# Patient Record
Sex: MALE | Race: BLACK OR AFRICAN AMERICAN | Employment: UNEMPLOYED | URBAN - METROPOLITAN AREA
[De-identification: names, ages, dates, MRNs, and addresses within clinical notes are randomized per-mention and may not be internally consistent; named-entity substitution may affect disease eponyms.]

---

## 2017-08-08 LAB
AVERAGE GLUCOSE: NORMAL
HBA1C MFR BLD: 7.6 %

## 2017-09-14 ENCOUNTER — OFFICE VISIT (OUTPATIENT)
Dept: FAMILY MEDICINE CLINIC | Age: 47
End: 2017-09-14
Payer: MEDICARE

## 2017-09-14 VITALS
WEIGHT: 306 LBS | HEART RATE: 98 BPM | BODY MASS INDEX: 46.38 KG/M2 | RESPIRATION RATE: 20 BRPM | TEMPERATURE: 98.3 F | SYSTOLIC BLOOD PRESSURE: 134 MMHG | DIASTOLIC BLOOD PRESSURE: 85 MMHG | OXYGEN SATURATION: 94 % | HEIGHT: 68 IN

## 2017-09-14 DIAGNOSIS — H57.12 LEFT EYE PAIN: ICD-10-CM

## 2017-09-14 DIAGNOSIS — H10.502 BLEPHAROCONJUNCTIVITIS OF LEFT EYE, UNSPECIFIED BLEPHAROCONJUNCTIVITIS TYPE: Primary | ICD-10-CM

## 2017-09-14 PROBLEM — E55.9 UNSPECIFIED VITAMIN D DEFICIENCY: Status: ACTIVE | Noted: 2017-06-27

## 2017-09-14 PROBLEM — J20.9 ACUTE BRONCHITIS WITH BRONCHOSPASM: Status: ACTIVE | Noted: 2017-03-16

## 2017-09-14 PROBLEM — Z96.659 PAIN DUE TO KNEE JOINT PROSTHESIS (HCC): Status: ACTIVE | Noted: 2017-08-08

## 2017-09-14 PROBLEM — E78.5 HYPERLIPIDEMIA: Status: ACTIVE | Noted: 2017-05-09

## 2017-09-14 PROBLEM — I10 ESSENTIAL (PRIMARY) HYPERTENSION: Status: ACTIVE | Noted: 2017-05-09

## 2017-09-14 PROBLEM — F17.200 NICOTINE DEPENDENCE: Status: ACTIVE | Noted: 2017-08-28

## 2017-09-14 PROBLEM — J40 BRONCHITIS: Status: ACTIVE | Noted: 2017-03-19

## 2017-09-14 PROBLEM — M25.569 GONALGIA: Status: ACTIVE | Noted: 2017-04-30

## 2017-09-14 PROBLEM — Z96.659 ARTIFICIAL KNEE JOINT PRESENT: Status: ACTIVE | Noted: 2017-06-27

## 2017-09-14 PROBLEM — R55 EPISODE OF SYNCOPE: Status: ACTIVE | Noted: 2017-09-14

## 2017-09-14 PROBLEM — R11.14 BILIOUS VOMITING: Status: ACTIVE | Noted: 2017-04-30

## 2017-09-14 PROBLEM — H61.20 CERUMEN IMPACTION: Status: ACTIVE | Noted: 2017-05-25

## 2017-09-14 PROBLEM — Z79.899 OTHER LONG TERM (CURRENT) DRUG THERAPY: Status: ACTIVE | Noted: 2017-08-08

## 2017-09-14 PROBLEM — R07.9 CHEST PAIN: Status: ACTIVE | Noted: 2017-09-14

## 2017-09-14 PROBLEM — T84.84XA PAIN DUE TO KNEE JOINT PROSTHESIS (HCC): Status: ACTIVE | Noted: 2017-08-08

## 2017-09-14 PROCEDURE — 99214 OFFICE O/P EST MOD 30 MIN: CPT | Performed by: FAMILY MEDICINE

## 2017-09-14 RX ORDER — CARVEDILOL 12.5 MG/1
TABLET ORAL
Refills: 0 | COMMUNITY
Start: 2017-09-11

## 2017-09-14 RX ORDER — TOPIRAMATE 25 MG/1
25 CAPSULE, COATED PELLETS ORAL
COMMUNITY
Start: 2014-11-14 | End: 2021-10-07 | Stop reason: ALTCHOICE

## 2017-09-14 RX ORDER — PHENTERMINE HYDROCHLORIDE 37.5 MG/1
37.5 TABLET ORAL
COMMUNITY
Start: 2017-08-22

## 2017-09-14 RX ORDER — ERYTHROMYCIN 5 MG/G
OINTMENT OPHTHALMIC
Refills: 0 | COMMUNITY
Start: 2017-06-19 | End: 2017-09-14 | Stop reason: ALTCHOICE

## 2017-09-14 RX ORDER — LISINOPRIL 10 MG/1
TABLET ORAL
COMMUNITY
Start: 2017-06-14 | End: 2017-09-14 | Stop reason: ALTCHOICE

## 2017-09-14 RX ORDER — GLIPIZIDE 10 MG/1
TABLET, FILM COATED, EXTENDED RELEASE ORAL
Refills: 0 | COMMUNITY
Start: 2017-08-26

## 2017-09-14 RX ORDER — ESZOPICLONE 3 MG/1
TABLET, FILM COATED ORAL
COMMUNITY
Start: 2017-07-28

## 2017-09-14 RX ORDER — NITROGLYCERIN 0.4 MG/1
0.4 TABLET SUBLINGUAL
COMMUNITY

## 2017-09-14 RX ORDER — LISINOPRIL 10 MG/1
TABLET ORAL
Refills: 0 | COMMUNITY
Start: 2017-09-11

## 2017-09-14 RX ORDER — OMEPRAZOLE 40 MG/1
CAPSULE, DELAYED RELEASE ORAL
Refills: 0 | COMMUNITY
Start: 2017-09-11

## 2017-09-14 RX ORDER — METHOCARBAMOL 750 MG/1
TABLET ORAL
Refills: 0 | COMMUNITY
Start: 2017-09-06

## 2017-09-14 RX ORDER — ALBUTEROL SULFATE 90 UG/1
2 AEROSOL, METERED RESPIRATORY (INHALATION)
COMMUNITY

## 2017-09-14 RX ORDER — QUETIAPINE FUMARATE 50 MG/1
TABLET, FILM COATED ORAL
Refills: 0 | COMMUNITY
Start: 2017-09-05

## 2017-09-14 RX ORDER — LIDOCAINE AND PRILOCAINE 25; 25 MG/G; MG/G
CREAM TOPICAL
COMMUNITY
Start: 2017-08-11

## 2017-09-14 RX ORDER — PHENOL 1.4 %
AEROSOL, SPRAY (ML) MUCOUS MEMBRANE
COMMUNITY
Start: 2017-02-28

## 2017-09-14 RX ORDER — ATORVASTATIN CALCIUM 20 MG/1
TABLET, FILM COATED ORAL
COMMUNITY
Start: 2017-08-14

## 2017-09-14 RX ORDER — TRAZODONE HYDROCHLORIDE 100 MG/1
TABLET ORAL
COMMUNITY

## 2017-09-14 RX ORDER — CIPROFLOXACIN HYDROCHLORIDE 3.5 MG/ML
SOLUTION/ DROPS TOPICAL
COMMUNITY
Start: 2017-06-20 | End: 2017-09-14 | Stop reason: ALTCHOICE

## 2017-09-14 RX ORDER — TEMAZEPAM 15 MG/1
15 CAPSULE ORAL
COMMUNITY

## 2017-09-14 RX ORDER — ATORVASTATIN CALCIUM 20 MG/1
TABLET, FILM COATED ORAL
Refills: 0 | COMMUNITY
Start: 2017-09-11 | End: 2017-09-14 | Stop reason: ALTCHOICE

## 2017-09-14 RX ORDER — ZOLPIDEM TARTRATE 10 MG/1
10 TABLET ORAL
COMMUNITY
End: 2021-10-07 | Stop reason: ALTCHOICE

## 2017-09-14 RX ORDER — FLUTICASONE PROPIONATE 50 MCG
2 SPRAY, SUSPENSION (ML) NASAL
COMMUNITY

## 2017-09-14 RX ORDER — BUSPIRONE HYDROCHLORIDE 5 MG/1
5 TABLET ORAL
COMMUNITY
Start: 2014-11-14

## 2017-09-14 RX ORDER — CIPROFLOXACIN HYDROCHLORIDE 3.5 MG/ML
SOLUTION/ DROPS TOPICAL
Refills: 0 | COMMUNITY
Start: 2017-06-20 | End: 2017-09-14 | Stop reason: ALTCHOICE

## 2017-09-14 RX ORDER — AMLODIPINE BESYLATE 10 MG/1
10 TABLET ORAL
COMMUNITY
Start: 2014-12-11

## 2017-09-14 RX ORDER — LEVOTHYROXINE SODIUM 137 UG/1
TABLET ORAL
COMMUNITY
Start: 2016-12-18

## 2017-09-14 RX ORDER — OXYCODONE HYDROCHLORIDE 15 MG/1
TABLET ORAL
COMMUNITY
Start: 2017-07-28 | End: 2017-09-14 | Stop reason: ALTCHOICE

## 2017-09-14 RX ORDER — TRAMADOL HYDROCHLORIDE 50 MG/1
TABLET ORAL
Qty: 10 TABLET | Refills: 0 | Status: SHIPPED | OUTPATIENT
Start: 2017-09-14

## 2017-09-14 RX ORDER — LANCETS 30 GAUGE
1 EACH MISCELLANEOUS
COMMUNITY
Start: 2015-02-16

## 2017-09-14 RX ORDER — OMEPRAZOLE 40 MG/1
CAPSULE, DELAYED RELEASE ORAL
COMMUNITY
Start: 2017-08-14 | End: 2017-09-14 | Stop reason: SDUPTHER

## 2017-09-14 RX ORDER — CLINDAMYCIN HYDROCHLORIDE 300 MG/1
CAPSULE ORAL
Refills: 0 | COMMUNITY
Start: 2017-06-20 | End: 2017-09-14 | Stop reason: ALTCHOICE

## 2017-09-14 RX ORDER — MOXIFLOXACIN 5 MG/ML
1 SOLUTION/ DROPS OPHTHALMIC 3 TIMES DAILY
Qty: 3 ML | Refills: 0 | Status: SHIPPED | OUTPATIENT
Start: 2017-09-14 | End: 2017-09-21

## 2017-09-14 RX ORDER — ESCITALOPRAM OXALATE 20 MG/1
20 TABLET ORAL
COMMUNITY
Start: 2017-05-03

## 2017-09-14 RX ORDER — ACETAMINOPHEN 325 MG/1
325-650 TABLET ORAL
COMMUNITY
Start: 2014-12-10 | End: 2017-09-14 | Stop reason: ALTCHOICE

## 2017-09-14 RX ORDER — BUPROPION HYDROCHLORIDE 150 MG/1
150 TABLET, EXTENDED RELEASE ORAL
COMMUNITY
Start: 2017-05-11

## 2017-09-14 RX ORDER — ACETAMINOPHEN, DIPHENHYDRAMINE HCL, PHENYLEPHRINE HCL 325; 25; 5 MG/1; MG/1; MG/1
TABLET ORAL
Refills: 0 | COMMUNITY
Start: 2017-08-01 | End: 2021-10-07

## 2017-09-14 RX ORDER — CITALOPRAM 40 MG/1
40 TABLET ORAL
COMMUNITY
Start: 2014-12-08

## 2017-09-14 RX ORDER — OXYCODONE HYDROCHLORIDE 5 MG/1
5-10 TABLET ORAL
COMMUNITY
Start: 2015-03-21 | End: 2017-09-14 | Stop reason: ALTCHOICE

## 2017-09-14 RX ORDER — MELOXICAM 7.5 MG/1
7.5 TABLET ORAL
COMMUNITY

## 2017-09-14 RX ORDER — PHENTERMINE HYDROCHLORIDE 37.5 MG/1
TABLET ORAL
Refills: 0 | COMMUNITY
Start: 2017-08-08 | End: 2017-09-14 | Stop reason: ALTCHOICE

## 2017-09-14 RX ORDER — AMLODIPINE BESYLATE 10 MG/1
TABLET ORAL
Refills: 0 | COMMUNITY
Start: 2017-09-05 | End: 2017-09-14 | Stop reason: SDUPTHER

## 2017-09-14 RX ORDER — ALPRAZOLAM 2 MG/1
2 TABLET ORAL
COMMUNITY
End: 2017-09-14 | Stop reason: ALTCHOICE

## 2017-09-14 RX ORDER — ERGOCALCIFEROL 1.25 MG/1
CAPSULE ORAL
Refills: 0 | COMMUNITY
Start: 2017-06-27

## 2017-09-14 ASSESSMENT — ENCOUNTER SYMPTOMS
RESPIRATORY NEGATIVE: 1
EYE PAIN: 1
EYE REDNESS: 1
GASTROINTESTINAL NEGATIVE: 1
PHOTOPHOBIA: 0
EYE DISCHARGE: 1
ALLERGIC/IMMUNOLOGIC NEGATIVE: 1
EYE ITCHING: 1

## 2021-10-07 ENCOUNTER — INITIAL CONSULT (OUTPATIENT)
Dept: PAIN MANAGEMENT | Age: 51
End: 2021-10-07
Payer: MEDICARE

## 2021-10-07 VITALS
HEART RATE: 83 BPM | OXYGEN SATURATION: 96 % | WEIGHT: 315 LBS | HEIGHT: 67 IN | BODY MASS INDEX: 49.44 KG/M2 | SYSTOLIC BLOOD PRESSURE: 165 MMHG | DIASTOLIC BLOOD PRESSURE: 106 MMHG

## 2021-10-07 DIAGNOSIS — M25.561 CHRONIC KNEE PAIN AFTER TOTAL REPLACEMENT OF RIGHT KNEE JOINT: Primary | ICD-10-CM

## 2021-10-07 DIAGNOSIS — Z79.899 CHRONIC PRESCRIPTION BENZODIAZEPINE USE: ICD-10-CM

## 2021-10-07 DIAGNOSIS — Z99.89 OSA ON CPAP: ICD-10-CM

## 2021-10-07 DIAGNOSIS — E66.01 MORBID OBESITY WITH BMI OF 50.0-59.9, ADULT (HCC): ICD-10-CM

## 2021-10-07 DIAGNOSIS — G47.33 OSA ON CPAP: ICD-10-CM

## 2021-10-07 DIAGNOSIS — Z96.651 CHRONIC KNEE PAIN AFTER TOTAL REPLACEMENT OF RIGHT KNEE JOINT: Primary | ICD-10-CM

## 2021-10-07 DIAGNOSIS — G89.29 CHRONIC KNEE PAIN AFTER TOTAL REPLACEMENT OF RIGHT KNEE JOINT: Primary | ICD-10-CM

## 2021-10-07 PROCEDURE — 99243 OFF/OP CNSLTJ NEW/EST LOW 30: CPT | Performed by: ANESTHESIOLOGY

## 2021-10-07 PROCEDURE — G8484 FLU IMMUNIZE NO ADMIN: HCPCS | Performed by: ANESTHESIOLOGY

## 2021-10-07 PROCEDURE — G8427 DOCREV CUR MEDS BY ELIG CLIN: HCPCS | Performed by: ANESTHESIOLOGY

## 2021-10-07 PROCEDURE — G8419 CALC BMI OUT NRM PARAM NOF/U: HCPCS | Performed by: ANESTHESIOLOGY

## 2021-10-07 RX ORDER — ASPIRIN 325 MG
TABLET ORAL
COMMUNITY

## 2021-10-07 ASSESSMENT — ENCOUNTER SYMPTOMS
SINUS PAIN: 1
SINUS PRESSURE: 1
CONSTIPATION: 1
EYES NEGATIVE: 1
RHINORRHEA: 1
RESPIRATORY NEGATIVE: 1

## 2021-10-07 NOTE — PROGRESS NOTES
The patient is a 46 y. o. Non- / non  male. Chief Complaint   Patient presents with    Knee Pain     Right    Consultation        HPI    Requesting physician for the evaluation of Patrizia Ruff 1970: Dr Cipriano Ott    Pain History  58-year-old man referred for recommendation regarding right knee pain  Onset of symptom many years ago  States he was diagnosed with knee arthritis at bilateral total knee replacement surgery  Left knee did okay but when he had surgery earlier this year he has not felt good in his right knee since then  He had surgery at [facility] [place]  He did therapy after that  He states that he continued to have severe right knee pain as aching throbbing burning sensation  For persistent pain he went [facility] to [facility], [facility] and is now planning for a second knee reveal revision replacement surgery  Patient recalls a steroid injection recently but unable to recall where was not done  He states that he is referred here today for pain medication to manage his pain until his surgery    Pain score today  9  1. Location:Right knee  2. Radiation:no  3. Character:Nagging, Numb, Aching, Throbbing, Shooting, Sharp, Tender  5. Duration:constant  6. Onset: years  7. Did an injury cause pain: no  8. Aggravating factors:Standing, sitting, bending, get up in morning, stairs  9. Alleviating factors:Pain meds  10. Associated symptoms (numbness / tingling / weakness):  yes, tingling, weakness  -Where at:right knee  -Down into finger tips or toes (specify which finger or toes):no  -constant or intermitting: intermitting  11. Red Flags: (weight loss / chills / loss of bladder or bowel control):no    Previous management history  1. Previous diagnostic workup: (Imaging/EMG)   CT, MRI, or Xray: CT, Xray  What part of the body:right knee  What facility did they have it at:[facility]  What year or specific date: 09/24/2021  EMG:  yes    2.  Previous non interventional treatments tried:  chiropractor or physical therapy: Physical Therapy  What part of the body:Right Knee  What [facility] done [facility]: [facility]  How long ago was it last tried:months  Did it work: a little bit  Did they complete it:Yes    3. Previous Medications tried  NSAID's: yes  Neurontin: yes  Lyrica: yes  Trycyclic antidepressant (Ellavil / Pamelor ): no  Cymbalta: no  Opioids (Ultram / Vicodin / Percocet / Morphine / Dilaudid / Oramorph/ Fentanyl etc.):Percocet  Last Pain medication taken (name of med and date): 3 months ago    4. Previous Interventional pain procedures tried:  What kind of injection:Steroid injection  Who did the injection: unk  did the injection help: no  Last time injection was done: 1 month ago    5. Previous surgeries for pain  What part of the body did they have the surgery:right knee x3  What physician did the surgery:Dr Ren [id number], [address] did they have the [facility] done: [facility]  Date of Surgery:03/12/21, 08/2013,04/2016 07/18/2016,[id number]    Social History:  Marital status:Single  Employment History:none  Working  No  Full time Or Part time: n/a  Disability  Yes   Legal Issues related to pain complaint: No     Pain Disability Index score : 81    Lab Results   Component Value Date    LABA1C 7.6 08/08/2017     No results found for: EAG      Informant: patient      Past Medical History:   Diagnosis Date    Anxiety     Chronic constipation     Coronary artery disease     Dental disease     Depression     Diabetes mellitus type 2 in obese ([address]     Difficult intravenous access     Diverticulitis of colon     GERD (gastroesophageal reflux disease)     Hyperlipidemia     Hypertension     Hypothyroidism     Kidney stone     MRSA infection (methicillin-resistant Staphylococcus aureus)     Obesity     Osteoarthritis     Shortness of breath     Sleep apnea     Visual impairment     Vitamin D deficiency     VRE (vancomycin resistant enterococcus) culture positive Rt knee        Past Surgical History:   Procedure Laterality Date    COLECTOMY      2005    KNEE SURGERY  2021    REVISION TOTAL KNEE ARTHROPLASTY Left     2014    REVISION TOTAL KNEE ARTHROPLASTY Right 2021    Joint    REVISION TOTAL KNEE ARTHROPLASTY Right 10/25/2017    Joint    ROTATOR CUFF REPAIR Right     2012    TONSILLECTOMY AND ADENOIDECTOMY      TOTAL KNEE ARTHROPLASTY Left     2013    TOTAL KNEE ARTHROPLASTY Right     2016       Social History     Socioeconomic History    Marital status: Single     Spouse name: None    Number of children: None    Years of education: None    Highest education level: None   Occupational History    None   Tobacco Use    Smoking status: Former Smoker     Quit date: 2016     Years since quittin.0    Smokeless tobacco: Never Used   Substance and Sexual Activity    Alcohol use: No    Drug use: No    Sexual activity: None   Other Topics Concern    None   Social History Narrative    None     Social Determinants of Health     Financial Resource Strain:     Difficulty of Paying Living Expenses:    Food Insecurity:     Worried About Running Out of Food in the Last Year:     Ran Out of Food in the Last Year:    Transportation Needs:     Lack of Transportation (Medical):  Lack of Transportation (Non-Medical):    Physical Activity:     Days of Exercise per Week:     Minutes of Exercise per Session:    Stress:     Feeling of Stress :    Social Connections:     Frequency of Communication with Friends and Family:     Frequency of Social Gatherings with Friends and Family:     Attends Christianity Services:     Active Member of Clubs or Organizations:     Attends Club or Organization Meetings:     Marital Status:    Intimate Partner Violence:     Fear of Current or Ex-Partner:     Emotionally Abused:     Physically Abused:     Sexually Abused:        No family history on file.     Allergies   Allergen Reactions    Hydrocodone-Acetaminophen      Itching, rash       Vitals:    10/07/21 1552   BP: (!) 165/106   Pulse: 83   SpO2:        Current Outpatient Medications   Medication Sig Dispense Refill    aspirin 325 MG tablet aspirin 325 mg tablet   take 1 tablet by mouth twice a day      albuterol sulfate  (90 Base) MCG/ACT inhaler Inhale 2 puffs into the lungs      amLODIPine (NORVASC) 10 MG tablet Take 10 mg by mouth      atorvastatin (LIPITOR) 20 MG tablet take 1 tablet by mouth at bedtime      Ascorbic Acid 500 MG CAPS Take 500 mg by mouth      buPROPion (WELLBUTRIN SR) 150 MG extended release tablet Take 150 mg by mouth      busPIRone (BUSPAR) 5 MG tablet Take 5 mg by mouth      D3-50 01549 units CAPS   0    Cholecalciferol 22622 units TABS One capsule by mouth 3 x a week for 6 weeks, then decrease to 50,000 units of vitamin D3 per week x 6 wks. Bin Blake vitamin D (ERGOCALCIFEROL) 61558 units CAPS capsule take 1 capsule by mouth every week  0    escitalopram (LEXAPRO) 20 MG tablet Take 20 mg by mouth      eszopiclone (LUNESTA) 3 MG TABS       fluticasone (FLONASE) 50 MCG/ACT nasal spray 2 sprays by Nasal route      glipiZIDE (GLUCOTROL XL) 10 MG extended release tablet   0    hydrocortisone 2.5 % cream Place rectally      levothyroxine (SYNTHROID) 137 MCG tablet take 1 tablet by mouth once daily      lidocaine-prilocaine (EMLA) 2.5-2.5 % cream       lisinopril (PRINIVIL;ZESTRIL) 10 MG tablet   0    citalopram (CELEXA) 40 MG tablet Take 40 mg by mouth      Melatonin 10 MG TABS       meloxicam (MOBIC) 7.5 MG tablet Take 7.5 mg by mouth      nitroGLYCERIN (NITROSTAT) 0.4 MG SL tablet Place 0.4 mg under the tongue      omeprazole (PRILOSEC) 40 MG delayed release capsule   0    phentermine (ADIPEX-P) 37.5 MG tablet Take 37.5 mg by mouth      QUEtiapine (SEROQUEL) 50 MG tablet   0    temazepam (RESTORIL) 15 MG capsule Take 15 mg by mouth      traZODone (DESYREL) 100 MG tablet TraZODone HCl - 100 MG Oral Tablet  TAKE 1 TABLET AT BEDTIME. Refills: 0  Active      traMADol (ULTRAM) 50 MG tablet Please take one tablet at night for breakthrough pain only ! 10 tablet 0    Carboxymeth-Glycerin-Polysorb 0.5-1-0.5 % SOLN Refresh Optive Advanced 0.5 %-1 %-0.5 % eye drops   instill 1 drop into both eyes four times a day      carvedilol (COREG) 12.5 MG tablet  (Patient not taking: Reported on 10/7/2021)  0    Lancets MISC 1 each by Other route       No current facility-administered medications for this visit. Review of Systems   Constitutional: Positive for chills and fatigue. Negative for fever. HENT: Positive for rhinorrhea, sinus pressure and sinus pain. Eyes: Negative. Respiratory: Negative. Cardiovascular: Positive for leg swelling. Gastrointestinal: Positive for constipation. Endocrine: Positive for cold intolerance. Genitourinary: Negative. Musculoskeletal: Positive for arthralgias, gait problem, joint swelling and myalgias. Skin: Negative. Allergic/Immunologic: Positive for environmental allergies. Neurological: Positive for weakness and numbness. Hematological: Bruises/bleeds easily. Psychiatric/Behavioral: Positive for sleep disturbance. The patient is nervous/anxious. Objective:  General Appearance:  Uncomfortable and in pain. Vital signs: (most recent): Blood pressure (!) 165/106, pulse 83, height 5' 7\" (1.702 m), weight (!) 338 lb 12.8 oz (153.7 kg), SpO2 96 %. Vital signs are normal.  No fever. Output: Producing urine and producing stool. HEENT: Normal HEENT exam.    Lungs:  Normal effort and normal respiratory rate. Breath sounds clear to auscultation. He is not in respiratory distress. No decreased breath sounds. Heart: Normal rate. Regular rhythm. Extremities: Decreased range of motion. There is no deformity, effusion, dependent edema or local swelling. Neurological: Patient is alert and oriented to person, place and time.   Normal strength. Patient has normal coordination. Pupils:  Pupils are equal, round, and reactive to light. Pupils are equal.   Skin:  Warm and dry. No rash or cyanosis. Assessment & Plan   Pain History  49-year-old man referred for recommendation regarding right knee pain  Onset of symptom many years ago  States he was diagnosed with knee arthritis at bilateral total knee replacement surgery  Left knee did okay but when he had surgery earlier this year he has not felt good in his right knee since then  He had surgery at [facility]  He did therapy after that  He states that he continued to have severe right knee pain as aching throbbing burning sensation  For persistent pain he went for second opinion to [facility] and is now planning for a second knee reveal revision replacement surgery  Patient recalls a steroid injection recently but unable to recall where was not done  He states that he is referred here today for pain medication to manage his pain until his surgery    Pain score today  9  1. Chronic knee pain after total replacement of right knee joint    2. Morbid obesity with BMI of 50.0-59.9, adult ([address]    3. Chronic prescription benzodiazepine use    4.  JEET on CPAP        - Morbid obesity BMI above 53, concomitant benzodiazepine use and obstructive sleep apnea  I will either prescribe not recommend opioid  I will recommend conservative management with topical local anesthetic containing compounding cream along with NSAIDs systemic and TENS unit    I do not think he is a candidate for any interventional procedure for chronic knee pain such as genicular nerve block or nerve ablation as surgery is being planned at this time           Electronically signed by Pasquale Head MD on 10/7/2021 at 4:23 PM

## 2022-12-22 ENCOUNTER — TELEPHONE (OUTPATIENT)
Dept: BARIATRICS/WEIGHT MGMT | Age: 52
End: 2022-12-22

## 2022-12-22 NOTE — TELEPHONE ENCOUNTER
Online Info Session Completed:  on 12/13/2022 okay to schedule with Dr. Phill Ng   with [facility]. Patient informed the following: This is NOT a guarantee of payment  When stating that you have a Benefit or Coverage for Bariatric Surgery - that means that you may qualify for the surgery  Bariatric Surgery is considered an elective procedure, patient is responsible to know their benefits . Any information we obtain when calling your insurance  is not  a guarantee of  coverage  and/or  benefit. Appointment Note :   New Patient , [facility]   3   month visits,  PG Fee $200,  Mailed Packet or advised to arrive  early      Remind Patient of $200 Program fee with $ 100 required at Second visit with office on initial dietician visit. Remind Patient they must be nicotine free. They will be tested at the beginning of the program and prior to surgery. Advise Patient Responsible for out of pocket, copay at medical visits, Deductible and coinsurance applied to medical visits and procedure. You will be responsible for any of the following:  Copays   Deductibles   Co insurances     The items mentioned above are indicated or required by your insurance plan. Your deductible and coinsurance are applied to medical visits and procedures. Verified with patient if he or she has had any previous bariatric surgery? no  ( If yes ,advise patient of transfer of care process and program fee)       .

## 2024-03-26 ENCOUNTER — OFFICE VISIT (OUTPATIENT)
Dept: PAIN MANAGEMENT | Age: 54
End: 2024-03-26
Payer: MEDICAID

## 2024-03-26 VITALS — HEART RATE: 90 BPM | BODY MASS INDEX: 49.44 KG/M2 | WEIGHT: 315 LBS | OXYGEN SATURATION: 95 % | HEIGHT: 67 IN

## 2024-03-26 DIAGNOSIS — E66.01 MORBID OBESITY WITH BMI OF 50.0-59.9, ADULT (HCC): ICD-10-CM

## 2024-03-26 DIAGNOSIS — G89.29 CHRONIC KNEE PAIN AFTER TOTAL REPLACEMENT OF RIGHT KNEE JOINT: Primary | ICD-10-CM

## 2024-03-26 DIAGNOSIS — Z79.891 CHRONIC USE OF OPIATE DRUG FOR THERAPEUTIC PURPOSE: ICD-10-CM

## 2024-03-26 DIAGNOSIS — M25.561 CHRONIC KNEE PAIN AFTER TOTAL REPLACEMENT OF RIGHT KNEE JOINT: Primary | ICD-10-CM

## 2024-03-26 DIAGNOSIS — G47.33 OSA ON CPAP: ICD-10-CM

## 2024-03-26 DIAGNOSIS — Z96.651 CHRONIC KNEE PAIN AFTER TOTAL REPLACEMENT OF RIGHT KNEE JOINT: Primary | ICD-10-CM

## 2024-03-26 PROCEDURE — 99214 OFFICE O/P EST MOD 30 MIN: CPT | Performed by: ANESTHESIOLOGY

## 2024-03-26 RX ORDER — TRIAMCINOLONE ACETONIDE 5 MG/G
1 CREAM TOPICAL 2 TIMES DAILY
COMMUNITY
Start: 2023-11-30

## 2024-03-26 RX ORDER — LISINOPRIL 40 MG/1
40 TABLET ORAL EVERY MORNING
COMMUNITY
Start: 2024-03-06

## 2024-03-26 RX ORDER — TESTOSTERONE GEL, 1% 10 MG/G
GEL TRANSDERMAL
COMMUNITY
Start: 2024-01-16

## 2024-03-26 RX ORDER — ATORVASTATIN CALCIUM 80 MG/1
80 TABLET, FILM COATED ORAL EVERY MORNING
COMMUNITY
Start: 2024-02-29

## 2024-03-26 RX ORDER — LEVOTHYROXINE SODIUM 0.2 MG/1
TABLET ORAL
COMMUNITY
Start: 2024-02-08

## 2024-03-26 RX ORDER — SPIRONOLACTONE 25 MG/1
25 TABLET ORAL DAILY
COMMUNITY
Start: 2024-03-06

## 2024-03-26 RX ORDER — METOPROLOL SUCCINATE 100 MG/1
100 TABLET, EXTENDED RELEASE ORAL
COMMUNITY
Start: 2023-06-29

## 2024-03-26 RX ORDER — ZOLPIDEM TARTRATE 10 MG/1
10 TABLET ORAL NIGHTLY
COMMUNITY

## 2024-03-26 RX ORDER — TIZANIDINE 4 MG/1
4 TABLET ORAL NIGHTLY
COMMUNITY
Start: 2024-02-23 | End: 2024-04-23

## 2024-03-26 RX ORDER — TRAZODONE HYDROCHLORIDE 150 MG/1
150 TABLET ORAL NIGHTLY PRN
COMMUNITY
Start: 2024-03-16

## 2024-03-26 RX ORDER — CHOLECALCIFEROL (VITAMIN D3) 125 MCG
1 CAPSULE ORAL EVERY MORNING
COMMUNITY
Start: 2024-02-22

## 2024-03-26 ASSESSMENT — ENCOUNTER SYMPTOMS
RESPIRATORY NEGATIVE: 1
EYES NEGATIVE: 1
GASTROINTESTINAL NEGATIVE: 1

## 2024-03-26 NOTE — PROGRESS NOTES
The patient is a 54 y.o.Non- / non  male.    Chief Complaint   Patient presents with    Knee Pain     R      Patient is here today for: R Knee pain  Pain level: 8/10  Character: aching  Radiating: yes R Leg  Weakness or numbness: both and soreness  Aggravating Factors: walking weight bearing  Alleviating Factors: nothing  Constant or intermitting: constant  Bladder/bowel loss: no      Past Medical History:   Diagnosis Date    Anxiety     Chronic constipation     Coronary artery disease     Dental disease     Depression     Diabetes mellitus type 2 in obese (HCC)     Difficult intravenous access     Diverticulitis of colon     GERD (gastroesophageal reflux disease)     Hyperlipidemia     Hypertension     Hypothyroidism     Kidney stone     MRSA infection (methicillin-resistant Staphylococcus aureus)     Obesity     Osteoarthritis     Shortness of breath     Sleep apnea     Visual impairment     Vitamin D deficiency     VRE (vancomycin resistant enterococcus) culture positive     Rt knee        Past Surgical History:   Procedure Laterality Date    COLECTOMY      2005    KNEE SURGERY  2021    REVISION TOTAL KNEE ARTHROPLASTY Left     2014    REVISION TOTAL KNEE ARTHROPLASTY Right 2021    Joint    REVISION TOTAL KNEE ARTHROPLASTY Right 10/25/2017    Joint    ROTATOR CUFF REPAIR Right     2012    TONSILLECTOMY AND ADENOIDECTOMY      TOTAL KNEE ARTHROPLASTY Left     2013    TOTAL KNEE ARTHROPLASTY Right     2016       Social History     Socioeconomic History    Marital status: Single     Spouse name: None    Number of children: None    Years of education: None    Highest education level: None   Tobacco Use    Smoking status: Former     Current packs/day: 0.00     Types: Cigarettes     Quit date: 2016     Years since quittin.5    Smokeless tobacco: Never   Substance and Sexual Activity    Alcohol use: No    Drug use: No       History reviewed. No pertinent family

## 2024-04-30 ENCOUNTER — OFFICE VISIT (OUTPATIENT)
Dept: PAIN MANAGEMENT | Age: 54
End: 2024-04-30
Payer: MEDICAID

## 2024-04-30 VITALS — WEIGHT: 315 LBS | HEIGHT: 67 IN | BODY MASS INDEX: 49.44 KG/M2 | OXYGEN SATURATION: 96 % | HEART RATE: 90 BPM

## 2024-04-30 DIAGNOSIS — G89.29 CHRONIC KNEE PAIN AFTER TOTAL REPLACEMENT OF RIGHT KNEE JOINT: ICD-10-CM

## 2024-04-30 DIAGNOSIS — G90.521 COMPLEX REGIONAL PAIN SYNDROME TYPE 1 OF RIGHT LOWER EXTREMITY: Primary | ICD-10-CM

## 2024-04-30 DIAGNOSIS — Z96.651 CHRONIC KNEE PAIN AFTER TOTAL REPLACEMENT OF RIGHT KNEE JOINT: ICD-10-CM

## 2024-04-30 DIAGNOSIS — Z79.891 CHRONIC USE OF OPIATE DRUG FOR THERAPEUTIC PURPOSE: ICD-10-CM

## 2024-04-30 DIAGNOSIS — M25.561 CHRONIC KNEE PAIN AFTER TOTAL REPLACEMENT OF RIGHT KNEE JOINT: ICD-10-CM

## 2024-04-30 PROCEDURE — 99214 OFFICE O/P EST MOD 30 MIN: CPT | Performed by: ANESTHESIOLOGY

## 2024-04-30 ASSESSMENT — ENCOUNTER SYMPTOMS
SHORTNESS OF BREATH: 0
BACK PAIN: 0
CONSTIPATION: 0
NAUSEA: 0
DIARRHEA: 0
VOMITING: 0

## 2024-04-30 NOTE — PROGRESS NOTES
The patient is a 54 y.o.Non- / non  male.    Chief Complaint   Patient presents with    Knee Pain        Knee Pain       Patient is here today for: knee pain  Pain level: 8  Character: aching  Radiating: no  Weakness or numbness: no  Aggravating Factors: walking,sitting,up and down  Alleviating Factors: ice  Constant or intermitting: constant  Bladder/bowel loss: no       Past Medical History:   Diagnosis Date    Anxiety     Chronic constipation     Coronary artery disease     Dental disease     Depression     Diabetes mellitus type 2 in obese     Difficult intravenous access     Diverticulitis of colon     GERD (gastroesophageal reflux disease)     Hyperlipidemia     Hypertension     Hypothyroidism     Kidney stone     MRSA infection (methicillin-resistant Staphylococcus aureus)     Obesity     Osteoarthritis     Shortness of breath     Sleep apnea     Visual impairment     Vitamin D deficiency     VRE (vancomycin resistant enterococcus) culture positive     Rt knee        Past Surgical History:   Procedure Laterality Date    COLECTOMY          KNEE SURGERY  2021    REVISION TOTAL KNEE ARTHROPLASTY Left     2014    REVISION TOTAL KNEE ARTHROPLASTY Right 2021    Joint    REVISION TOTAL KNEE ARTHROPLASTY Right 10/25/2017    Joint    ROTATOR CUFF REPAIR Right     2012    TONSILLECTOMY AND ADENOIDECTOMY      TOTAL KNEE ARTHROPLASTY Left     2013    TOTAL KNEE ARTHROPLASTY Right     2016       Social History     Socioeconomic History    Marital status: Single     Spouse name: None    Number of children: None    Years of education: None    Highest education level: None   Tobacco Use    Smoking status: Former     Current packs/day: 0.00     Types: Cigarettes     Quit date: 2016     Years since quittin.6    Smokeless tobacco: Never   Substance and Sexual Activity    Alcohol use: No    Drug use: No       No family history on file.    Allergies   Allergen Reactions    
mouth      Melatonin 10 MG TABS       meloxicam (MOBIC) 7.5 MG tablet Take 7.5 mg by mouth      nitroGLYCERIN (NITROSTAT) 0.4 MG SL tablet Place 0.4 mg under the tongue      omeprazole (PRILOSEC) 40 MG delayed release capsule   0    phentermine (ADIPEX-P) 37.5 MG tablet Take 37.5 mg by mouth      QUEtiapine (SEROQUEL) 50 MG tablet   0    temazepam (RESTORIL) 15 MG capsule Take 15 mg by mouth      traZODone (DESYREL) 100 MG tablet TraZODone HCl - 100 MG Oral Tablet  TAKE 1 TABLET AT BEDTIME.   Refills: 0  Active      traMADol (ULTRAM) 50 MG tablet Please take one tablet at night for breakthrough pain only ! 10 tablet 0     No current facility-administered medications for this visit.       Review of Systems   Constitutional:  Negative for chills, fatigue and fever.   Respiratory:  Negative for shortness of breath.    Cardiovascular:  Negative for chest pain and palpitations.   Gastrointestinal:  Negative for constipation, diarrhea, nausea and vomiting.   Musculoskeletal:  Negative for back pain and neck pain.        Knee pain   Neurological:  Negative for weakness and numbness.         Objective:  Vital signs: (most recent): Pulse 90, height 1.702 m (5' 7\"), weight (!) 153.3 kg (338 lb), SpO2 96 %.  No fever.    Extremities: (Musculoskeletal:      Right knee: Bony tenderness present. No deformity, effusion, erythema, ecchymosis or crepitus. Decreased range of motion. Tenderness present. Normal alignment.      Left knee: Normal.   Neurological:      Sensory: Sensation is intact.      Motor: Motor function is intact.     )        Plan:    (Discussed alternative option of spinal nerve stimulator, pt voiced understanding and agreeable to proceed. ).     1. Chronic knee pain after total replacement of right knee joint    2. Chronic prescription benzodiazepine use    3. History of knee surgery    4. Chronic use of opiate drug for therapeutic purpose        No orders of the defined types were placed in this encounter.     No